# Patient Record
Sex: MALE | Race: WHITE | NOT HISPANIC OR LATINO | Employment: FULL TIME | URBAN - METROPOLITAN AREA
[De-identification: names, ages, dates, MRNs, and addresses within clinical notes are randomized per-mention and may not be internally consistent; named-entity substitution may affect disease eponyms.]

---

## 2022-07-18 ENCOUNTER — TELEPHONE (OUTPATIENT)
Dept: UROLOGY | Facility: CLINIC | Age: 37
End: 2022-07-18

## 2022-07-18 NOTE — TELEPHONE ENCOUNTER
Spoke with pt, he would like to be seen for possible std. Pt scheduled for tomorrow.    ----- Message from Kenisha Sharif sent at 7/18/2022  2:11 PM CDT -----  Type:  Needs Medical Advice    Who Called: Billy Felton    Symptoms (please be specific): -   How long has patient had these symptoms:  -  Pharmacy name and phone #:  -  Would the patient rather a call back or a response via [facility]?    Best Call Back Number: [phone] or [phone]    Additional Information:  pt checking status of SDA to be tested for STD please call

## 2022-07-19 ENCOUNTER — OFFICE VISIT (OUTPATIENT)
Dept: UROLOGY | Facility: CLINIC | Age: 37
End: 2022-07-19

## 2022-07-19 VITALS
SYSTOLIC BLOOD PRESSURE: 143 MMHG | DIASTOLIC BLOOD PRESSURE: 79 MMHG | HEIGHT: 69 IN | BODY MASS INDEX: 22.96 KG/M2 | WEIGHT: 155 LBS | HEART RATE: 74 BPM

## 2022-07-19 DIAGNOSIS — R30.0 DYSURIA: Primary | ICD-10-CM

## 2022-07-19 DIAGNOSIS — Z20.2 STD EXPOSURE: ICD-10-CM

## 2022-07-19 LAB
HIV 1+2 AB+HIV1 P24 AG SERPL QL IA: NONREACTIVE
SYPHILIS TREPONEMAL ANTIBODY: NONREACTIVE

## 2022-07-19 PROCEDURE — 96372 THER/PROPH/DIAG INJ SC/IM: CPT | Mod: S$GLB,,, | Performed by: NURSE PRACTITIONER

## 2022-07-19 PROCEDURE — 96372 PR INJECTION,THERAP/PROPH/DIAG2ST, IM OR SUBCUT: ICD-10-PCS | Mod: S$GLB,,, | Performed by: NURSE PRACTITIONER

## 2022-07-19 PROCEDURE — 99203 OFFICE O/P NEW LOW 30 MIN: CPT | Mod: 25,S$GLB,, | Performed by: NURSE PRACTITIONER

## 2022-07-19 PROCEDURE — 99203 PR OFFICE/OUTPT VISIT, NEW, LEVL III, 30-44 MIN: ICD-10-PCS | Mod: 25,S$GLB,, | Performed by: NURSE PRACTITIONER

## 2022-07-19 RX ORDER — CEFTRIAXONE 500 MG/1
500 INJECTION, POWDER, FOR SOLUTION INTRAMUSCULAR; INTRAVENOUS ONCE
Status: COMPLETED | OUTPATIENT
Start: 2022-07-19 | End: 2022-07-19

## 2022-07-19 RX ORDER — AMOXICILLIN 500 MG
CAPSULE ORAL DAILY
COMMUNITY

## 2022-07-19 RX ORDER — LORAZEPAM 0.5 MG/1
TABLET ORAL
COMMUNITY
Start: 2022-06-30

## 2022-07-19 RX ORDER — CEFTRIAXONE 500 MG/1
500 INJECTION, POWDER, FOR SOLUTION INTRAMUSCULAR; INTRAVENOUS
Status: CANCELLED | OUTPATIENT
Start: 2022-07-19 | End: 2022-07-19

## 2022-07-19 RX ORDER — IBUPROFEN 100 MG/5ML
1000 SUSPENSION, ORAL (FINAL DOSE FORM) ORAL DAILY
COMMUNITY

## 2022-07-19 RX ORDER — AZITHROMYCIN 500 MG/1
1000 TABLET, FILM COATED ORAL DAILY
Qty: 2 TABLET | Refills: 0 | Status: SHIPPED | OUTPATIENT
Start: 2022-07-19

## 2022-07-19 RX ORDER — CHOLECALCIFEROL (VITAMIN D3) 25 MCG
1000 TABLET ORAL DAILY
COMMUNITY

## 2022-07-19 RX ADMIN — CEFTRIAXONE 500 MG: 500 INJECTION, POWDER, FOR SOLUTION INTRAMUSCULAR; INTRAVENOUS at 11:07

## 2022-07-19 NOTE — PROGRESS NOTES
Subjective:       Patient ID: Billy Felton is a 37 y.o. male.    Chief Complaint: Other (Burns sometimes when voiding, just wants STD check)      HPI: 37-year-old male, presents complaint of STD concern.  Patient states he had oral sex performed on him approximately 3 days ago.  Patient states since then he has been having some dysuria.  States he has burning at the tip of his penis with urination.  He denies any discharge.  Denies any open wounds or sores.  Denies any fever or body aches.  Denies any blood in the urine.  No other urinary complaints at this time.         Past Medical History:   Past Medical History:   Diagnosis Date    Anxiety disorder, unspecified        Past Surgical Historical:   Past Surgical History:   Procedure Laterality Date    CIRCUMCISION      infant    PLANTAR'S WART EXCISION Right     RHINOPLASTY          Medications:   Medication List with Changes/Refills   New Medications    AZITHROMYCIN (ZITHROMAX) 500 MG TABLET    Take 2 tablets (1,000 mg total) by mouth once daily.   Current Medications    ASCORBIC ACID, VITAMIN C, (VITAMIN C) 1000 MG TABLET    Take 1,000 mg by mouth once daily.    LORAZEPAM (ATIVAN) 0.5 MG TABLET        OMEGA-3 FATTY ACIDS/FISH OIL (FISH OIL-OMEGA-3 FATTY ACIDS) 300-1,000 MG CAPSULE    Take by mouth once daily.    VITAMIN D (VITAMIN D3) 1000 UNITS TAB    Take 1,000 Units by mouth once daily.        Past Social History:   Social History     Socioeconomic History    Marital status:    Tobacco Use    Smoking status: Never Smoker    Smokeless tobacco: Never Used   Substance and Sexual Activity    Alcohol use: Yes    Sexual activity: Yes       Allergies: Review of patient's allergies indicates:  No Known Allergies     Family History:   Family History   Problem Relation Age of Onset    No Known Problems Father     No Known Problems Mother         Review of Systems:  Review of Systems   Constitutional: Negative for activity change and appetite change.    HENT: Negative for congestion and dental problem.    Respiratory: Negative for chest tightness and shortness of breath.    Cardiovascular: Negative for chest pain.   Gastrointestinal: Negative for abdominal distention and abdominal pain.   Genitourinary: Positive for dysuria. Negative for decreased urine volume, difficulty urinating, enuresis, flank pain, frequency, genital sores, hematuria, penile discharge, penile pain, penile swelling, scrotal swelling, testicular pain and urgency.   Musculoskeletal: Negative for back pain and neck pain.   Neurological: Negative for dizziness.   Hematological: Negative for adenopathy.   Psychiatric/Behavioral: Negative for agitation, behavioral problems and confusion.       Physical Exam:  Physical Exam  Vitals and nursing note reviewed.   Constitutional:       Appearance: He is well-developed.   HENT:      Head: Normocephalic.   Cardiovascular:      Rate and Rhythm: Normal rate and regular rhythm.      Heart sounds: Normal heart sounds.   Pulmonary:      Effort: Pulmonary effort is normal.      Breath sounds: Normal breath sounds.   Abdominal:      General: Bowel sounds are normal.      Palpations: Abdomen is soft.   Genitourinary:     Penis: Normal and circumcised.    Skin:     General: Skin is warm and dry.   Neurological:      Mental Status: He is alert and oriented to person, place, and time.       Urinalysis:  Ketones 15 otherwise normal.    Assessment/Plan:   Dysuria/STD exposure:  Will treat patient with Rocephin 500 mg IM today.  Will also treat patient with azithromycin 1000 mg orally.  Script sent to pharmacy.  Will test patient for STDs.  Will test for syphilis, HSV, HIV, gonorrhea, and chlamydia.  Will notify patient results.    Follow-up as needed.  Problem List Items Addressed This Visit    None     Visit Diagnoses     Dysuria    -  Primary    Relevant Medications    cefTRIAXone injection 500 mg (Start on 7/19/2022 12:00 PM)    azithromycin (ZITHROMAX) 500 MG  tablet    Other Relevant Orders    POCT Urinalysis (w/Micro Option)    RPR    Herpes Simplex Virus (HSV) 1 & 2, IgM, IFA w/Rfx to Titer    HIV 1/2 Ag/Ab (4th Gen)    C. trachomatis/N. gonorrhoeae by AMP DNA    STD exposure        Relevant Orders    RPR    Herpes Simplex Virus (HSV) 1 & 2, IgM, IFA w/Rfx to Titer    HIV 1/2 Ag/Ab (4th Gen)    C. trachomatis/N. gonorrhoeae by AMP DNA

## 2022-07-20 ENCOUNTER — TELEPHONE (OUTPATIENT)
Dept: UROLOGY | Facility: CLINIC | Age: 37
End: 2022-07-20

## 2022-07-20 NOTE — TELEPHONE ENCOUNTER
----- Message from Birgit Lemos sent at 7/20/2022  9:29 AM CDT -----  Contact: self  Requesting a call back regarding results from visit he had - also wanted to make sure this would self-pay all the way. Please call back at [phone]      9:54 AM Returned patient's call in regards to lab results. Informed him that it may be a few more days before all of his lab results were in. Also informed him that self-pay was fine. Verbalized understanding.     NELY Singh RN

## 2022-07-22 ENCOUNTER — TELEPHONE (OUTPATIENT)
Dept: UROLOGY | Facility: CLINIC | Age: 37
End: 2022-07-22

## 2022-07-22 NOTE — TELEPHONE ENCOUNTER
Notified pt that once provider seen labs we would get back with him. ED----- Message from Shiv Suggs sent at 7/22/2022  9:19 AM CDT -----  Contact: pt  .Type:  Test Results    Who Called: ning  Name of Test (Lab/Mammo/Etc): blood work/ urine analysis  Date of Test:   Ordering Provider: cholo   Where the test was performed:   Would the patient rather a call back or a response via [facility]?   Best Call Back Number: .[phone]    Additional Information:

## 2024-04-29 ENCOUNTER — TELEPHONE (OUTPATIENT)
Dept: UROLOGY | Facility: CLINIC | Age: 39
End: 2024-04-29
Payer: COMMERCIAL

## 2024-04-29 NOTE — TELEPHONE ENCOUNTER
Returned request for call back. Patient requested an appointment for a vasectomy consult. Patient scheduled for this Wednesday, May 1, 2024 with [facility]. Verbalized understanding.                 ----- Message from Angie Funez sent at 4/29/2024 11:48 AM CDT -----  Regarding: Consultation  Contact: patient  Per phone call with patient he would like to have an consultation to  discuss having a vasectomy to be done with the physician.  Please return call at [phone] (home).    Thanks,  SJ

## 2024-05-01 ENCOUNTER — OFFICE VISIT (OUTPATIENT)
Dept: UROLOGY | Facility: CLINIC | Age: 39
End: 2024-05-01
Payer: COMMERCIAL

## 2024-05-01 VITALS
HEART RATE: 67 BPM | SYSTOLIC BLOOD PRESSURE: 121 MMHG | BODY MASS INDEX: 22.96 KG/M2 | DIASTOLIC BLOOD PRESSURE: 72 MMHG | HEIGHT: 69 IN | WEIGHT: 155 LBS

## 2024-05-01 DIAGNOSIS — Z30.2 ENCOUNTER FOR MALE STERILIZATION PROCEDURE: Primary | ICD-10-CM

## 2024-05-01 PROCEDURE — 3078F DIAST BP <80 MM HG: CPT | Mod: CPTII,S$GLB,, | Performed by: FAMILY MEDICINE

## 2024-05-01 PROCEDURE — 3074F SYST BP LT 130 MM HG: CPT | Mod: CPTII,S$GLB,, | Performed by: FAMILY MEDICINE

## 2024-05-01 PROCEDURE — 1159F MED LIST DOCD IN RCRD: CPT | Mod: CPTII,S$GLB,, | Performed by: FAMILY MEDICINE

## 2024-05-01 PROCEDURE — 99213 OFFICE O/P EST LOW 20 MIN: CPT | Mod: S$GLB,,, | Performed by: FAMILY MEDICINE

## 2024-05-01 PROCEDURE — 3008F BODY MASS INDEX DOCD: CPT | Mod: CPTII,S$GLB,, | Performed by: FAMILY MEDICINE

## 2024-05-01 NOTE — PROGRESS NOTES
Subjective:       Patient ID: Billy Felton is a 39 y.o. male.    Chief Complaint: No chief complaint on file.      HPI: 39-year-old male patient presenting to clinic to establish care requesting a vasectomy consult.  He has 2 children and denies any issues         Past Medical History:   Past Medical History:   Diagnosis Date    Anxiety disorder, unspecified        Past Surgical Historical:   Past Surgical History:   Procedure Laterality Date    CIRCUMCISION      infant    PLANTAR'S WART EXCISION Right     RHINOPLASTY          Medications:   Medication List with Changes/Refills   Current Medications    ASCORBIC ACID, VITAMIN C, (VITAMIN C) 1000 MG TABLET    Take 1,000 mg by mouth once daily.    LORAZEPAM (ATIVAN) 0.5 MG TABLET        OMEGA-3 FATTY ACIDS/FISH OIL (FISH OIL-OMEGA-3 FATTY ACIDS) 300-1,000 MG CAPSULE    Take by mouth once daily.    VITAMIN D (VITAMIN D3) 1000 UNITS TAB    Take 1,000 Units by mouth once daily.   Discontinued Medications    AZITHROMYCIN (ZITHROMAX) 500 MG TABLET    Take 2 tablets (1,000 mg total) by mouth once daily.        Past Social History:   Social History     Socioeconomic History    Marital status:    Tobacco Use    Smoking status: Never    Smokeless tobacco: Never   Substance and Sexual Activity    Alcohol use: Yes    Sexual activity: Yes       Allergies: Review of patient's allergies indicates:  No Known Allergies     Family History:   Family History   Problem Relation Name Age of Onset    No Known Problems Father      No Known Problems Mother          Review of Systems:  Review of Systems   Constitutional: Negative.    HENT: Negative.     Eyes: Negative.    Respiratory: Negative.     Cardiovascular: Negative.    Gastrointestinal: Negative.    Endocrine: Negative.    Genitourinary: Negative.    Musculoskeletal: Negative.    Skin: Negative.    Allergic/Immunologic: Negative.    Neurological: Negative.    Hematological: Negative.    Psychiatric/Behavioral: Negative.          Physical Exam:  Physical Exam  Constitutional:       Appearance: He is normal weight.   HENT:      Head: Normocephalic.      Nose: Nose normal.      Mouth/Throat:      Mouth: Mucous membranes are moist.      Pharynx: Oropharynx is clear.   Eyes:      Extraocular Movements: Extraocular movements intact.      Conjunctiva/sclera: Conjunctivae normal.      Pupils: Pupils are equal, round, and reactive to light.   Cardiovascular:      Rate and Rhythm: Normal rate and regular rhythm.      Pulses: Normal pulses.      Heart sounds: Normal heart sounds.   Pulmonary:      Effort: Pulmonary effort is normal.      Breath sounds: Normal breath sounds.   Abdominal:      General: Abdomen is flat. Bowel sounds are normal.      Palpations: Abdomen is soft.      Hernia: There is no hernia in the right inguinal area or left inguinal area.   Genitourinary:     Penis: Normal. No phimosis, paraphimosis, hypospadias, erythema, tenderness or discharge.       Testes: Normal.         Right: Mass, tenderness or swelling not present. Right testis is descended. Cremasteric reflex is present.          Left: Mass, tenderness or swelling not present. Left testis is descended. Cremasteric reflex is present.       Prostate: Normal.      Rectum: Normal.   Musculoskeletal:         General: Normal range of motion.      Cervical back: Normal range of motion and neck supple.   Lymphadenopathy:      Lower Body: No right inguinal adenopathy. No left inguinal adenopathy.   Skin:     General: Skin is warm and dry.      Capillary Refill: Capillary refill takes less than 2 seconds.   Neurological:      General: No focal deficit present.      Mental Status: He is alert and oriented to person, place, and time. Mental status is at baseline.   Psychiatric:         Mood and Affect: Mood normal.         Behavior: Behavior normal.         Thought Content: Thought content normal.         Judgment: Judgment normal.         Assessment/Plan:     Vasectomy:  We had  a long discussion regarding vasectomy including the risks and benefits.  The patient understands the permanent nature of the procedure he also understands the potential risks of vasectomy including but not limited to injury to the testicle loss of testicle bleeding hematoma infection and testicular atrophy.  Patient understands these risks is willing to proceed we will schedule vasectomy next available date.    Problem List Items Addressed This Visit    None  Visit Diagnoses       Encounter for male sterilization procedure    -  Primary    Relevant Orders    Vasectomy

## 2024-05-15 ENCOUNTER — PROCEDURE VISIT (OUTPATIENT)
Dept: UROLOGY | Facility: CLINIC | Age: 39
End: 2024-05-15
Payer: COMMERCIAL

## 2024-05-15 DIAGNOSIS — Z30.2 ENCOUNTER FOR MALE STERILIZATION PROCEDURE: Primary | ICD-10-CM

## 2024-05-15 PROCEDURE — 55250 REMOVAL OF SPERM DUCT(S): CPT | Mod: S$GLB,,, | Performed by: UROLOGY

## 2024-05-15 RX ORDER — HYDROCODONE BITARTRATE AND ACETAMINOPHEN 7.5; 325 MG/1; MG/1
1 TABLET ORAL EVERY 6 HOURS PRN
Qty: 10 TABLET | Refills: 0 | Status: SHIPPED | OUTPATIENT
Start: 2024-05-15

## 2024-05-15 NOTE — PROCEDURES
"Vasectomy    Date/Time: 5/15/2024 11:00 AM    Performed by: Adriano Thompson MD  Authorized by: Haley Slater NP    Consent Done?:  Yes (Written)  Time out: Immediately prior to procedure a "time out" was called to verify the correct patient, procedure, equipment, support staff and site/side marked as required.    Indications:  [place] male  Patient sedated: No    Preparation: Patient was prepped and draped in usual sterile fashion    Incisions:  1  Length vas excised:  2.5 cm  Vas:  Fulgurated and Buried  Sutures:  3-0 chromic SH  Same procedure performed on both sides    Patient tolerance:  Patient tolerated the procedure well with no immediate complications     Patient was brought to the procedure room placed on table in supine position he was then prepped and draped in the usual sterile fashion. A 2 cm incision was made upper portion of the midline of the scrotum secured into the dartos fascia of the right vas deferens was isolated and skeletonized using the Vas clamps, and at this point a 2 cm segment of the vas deferens was excised, both ends of the vas were fulgurated the proximal end was dunked into the surrounding dartos and pursestring closed the that is on the right side was returned to the scrotum the identical procedure was done on the contralateral side the incision was closed with a 3 O chromic suture patient tolerated the procedure with oral complications.    "

## 2024-05-15 NOTE — PATIENT INSTRUCTIONS
"Patient Education       Vasectomy   The Basics   Written by the doctors and editors at [facility]   What is a vasectomy? -- A vasectomy is a procedure that can be done as a type of long-term birth control. After a successful vasectomy, you will not be able to get a partner pregnant.  How does a vasectomy prevent pregnancy? -- A vasectomy prevents pregnancy by blocking the path the sperm takes to leave the body (figure 1).  Sperm are made in the testicles. The testicles are found inside a skin sac called the "scrotum." Sperm are stored in the epididymis, which is a small organ that sits on top of the testicles. During ejaculation, sperm travel from the epididymis through tubes and out the end of the penis.  During a vasectomy, a doctor cuts and blocks a tube called the "vas deferens" on each side. This prevents sperm from leaving the body. After a vasectomy, you can still ejaculate fluid, called semen. But the semen does not have any sperm in it.  Why might I choose to have a vasectomy? -- You might choose to have a vasectomy if you do not want any more biological children, and do not want to use birth control each time you have sex.  Let your doctor know if you have any questions or worries about having a vasectomy. They can talk with you and tell you about the procedure.  Some people choose to have a sample of their sperm saved before they have a vasectomy. If you want to have a sample of your sperm saved, talk with your doctor.  What happens during a vasectomy? -- A vasectomy is done in a doctor's office and takes about 30 minutes. During the procedure, a doctor numbs the skin on the scrotum. Then they make a small cut in the skin to reach the vas deferens, cut it, and seal it off. The procedure does not hurt, but some people can feel cramping or pulling.  What happens after a vasectomy? -- You can go home right after the procedure, but you will need to rest for 2 to 3 days. After a vasectomy, you will likely have " "some discomfort and bruising in your scrotum. Your doctor will tell you which pain-relieving medicines to take. They might also prescribe a medicine to treat your pain.  Your doctor will give you instructions about what you should and should not do after your vasectomy. They will probably tell you to:  Wear a jock strap to hold the bandage in place  Not bathe or swim for 1 to 2 days  Not lift heavy objects or exercise too hard for 7 days  Wait 7 days to have sex. After that, you must use another form of birth control for a few months to prevent pregnancy.  What are the side effects of a vasectomy? -- Side effects are uncommon, but can occur. They can include:  Severe pain in the scrotum  Bleeding in the scrotum  Infection of the skin around the cut  If you have any side effects, let your doctor know. Some side effects go away over time, but others might need treatment.  How long does it take for a vasectomy to work? -- It takes a few months for a vasectomy to work. That's because the tubes can still have sperm in them.  You will need to ejaculate 20 or more times after a vasectomy to clear out all the sperm from the tubes. Because of this, it's important to use another type of birth control for a few months to prevent getting a partner pregnant.  How will I know my vasectomy worked? -- Yes. You will have a follow-up test called a "sperm count" to make sure your semen does not have any sperm in it. This is usually done 3 months after their vasectomy.  A sperm count checks how many sperm are in a sample of semen. For this test, you will need to provide a sample of your semen.  If your sample has no sperm, you can stop using other birth control because you will not be able to get a partner pregnant. But if your sample does have sperm in it, you could get a partner pregnant. You should still use birth control until you have another sperm count done.  What if I change my mind after having a vasectomy? -- If you have had " "a vasectomy and decide that you do want to be able to get your partner pregnant, talk with your doctor. A surgery to reconnect the vas deferens and open the sperm's path can be done. But this surgery does not always work.  Does a vasectomy prevent me getting a disease from sex? -- No. A vasectomy does not prevent you from getting or spreading a disease from sex. To prevent getting or spreading a disease from sex, you should use a type of protection called a condom.  All topics are updated as new evidence becomes available and our peer review process is complete.  This topic retrieved from [employer] on: Sep 21, 2021.  Topic 66293 Version 8.0  Release: 29.4.2 - C29.263  © 2021 UpToDate, Inc. and/or its affiliates. All rights reserved.  figure 1: Vasectomy     A vasectomy is a procedure that can be done as a type of long-term birth control. After a successful vasectomy, you will not be able to get a partner pregnant.Sperm are made in the testicles and stored in the epididymis. During a vasectomy, a doctor cuts and blocks off the two tubes that carry sperm out of the epididymis. These tubes - one on the left and one on the right - are called the "vas deferens." After the surgery, sperm get reabsorbed into the body. The sperm do not come out during ejaculation.  Graphic 96845 Version 13.0    Consumer Information Use and Disclaimer   This information is not specific medical advice and does not replace information you receive from your health care provider. This is only a brief summary of general information. It does NOT include all information about conditions, illnesses, injuries, tests, procedures, treatments, therapies, discharge instructions or life-style choices that may apply to you. You must talk with your health care provider for complete information about your health and treatment options. This information should not be used to decide whether or not to accept your health care provider's advice, instructions or " recommendations. Only your health care provider has the knowledge and training to provide advice that is right for you. The use of this information is governed by the [employer] End User License Agreement, available at [email or web address]/[email or web address].The use of [employer] content is governed by the [employer] Terms of Use. ©2021 UpToDate, Inc. All rights reserved.  Copyright   © 2021 UpToDate, Inc. and/or its affiliates. All rights reserved.

## 2024-06-04 ENCOUNTER — TELEPHONE (OUTPATIENT)
Dept: UROLOGY | Facility: CLINIC | Age: 39
End: 2024-06-04
Payer: COMMERCIAL

## 2024-06-04 NOTE — TELEPHONE ENCOUNTER
Pt called stating post vas his stitches recently fell out and yesterday he had a scab that came off and is having some bleeding.  Pt has a band aid over the site. I advised pt to take band aid off and apply neosporin and keep open to air. Site is no longer bleeding. Pt will call with any other issues.    ----- Message from Ashley Grande sent at 6/4/2024  8:31 AM CDT -----  Contact: self  Type:  Patient Returning Call    Who Called:Billy Felton  Who Left Message for Patient:unsure  Does the patient know what this is regarding?:post op questions, mild bleeding, tenderness around surgical site,   Would the patient rather a call back or a response via [facility]?   Best Call Back Number:[phone]  Additional Information: pt states a scab was pulled off(attached to undergarments) causing a bleed

## 2024-08-22 ENCOUNTER — TELEPHONE (OUTPATIENT)
Dept: UROLOGY | Facility: CLINIC | Age: 39
End: 2024-08-22
Payer: COMMERCIAL

## 2024-08-22 NOTE — TELEPHONE ENCOUNTER
Spoke with pt and informed once analysis is reviewed I will give him a call with results.    ----- Message from Britt Juany sent at 8/22/2024  9:05 AM CDT -----  Contact: Billy  Type:  Test Results    Who Called: Billy  Name of Test (Lab/Mammo/Etc): Specimen test  Date of Test: 8/19/24  Ordering Provider: Dr. Thompson  Where the test was performed: [facility]  Would the patient rather a call back or a response via [facility]? call  Best Call Back Number: [phone]  Additional Information: Patient request to receive test results. Please give patient a call back to assist.   Thank you,  GH

## 2024-08-22 NOTE — TELEPHONE ENCOUNTER
Spoke with pt and informed pt based on his semen analysis results and after reviewed by Dr. Thompson he is not sterile. Pt advised to repeat analysis in 1 month and to ejaculate as much as he can. Pt will call back in 1 month for order to be sent and will come by to  semen kit. Pt verbalized understanding.

## 2024-09-20 ENCOUNTER — TELEPHONE (OUTPATIENT)
Dept: UROLOGY | Facility: CLINIC | Age: 39
End: 2024-09-20
Payer: COMMERCIAL

## 2024-09-20 NOTE — TELEPHONE ENCOUNTER
Order faxed.    ----- Message from Micheline Maradiaga sent at 9/20/2024  9:29 AM CDT -----  Regarding: orders  Rogelio in [facility] calling for order for semen analysis and need order to release results and she can be reached at [phone] and fax [phone].    Thanks,

## 2024-09-23 ENCOUNTER — TELEPHONE (OUTPATIENT)
Dept: UROLOGY | Facility: CLINIC | Age: 39
End: 2024-09-23
Payer: COMMERCIAL

## 2024-09-23 NOTE — TELEPHONE ENCOUNTER
Informed pt is analysis results will be reviewed by Dr. Thompson tomorrow and then will give him a call.    ----- Message from [employer] sent at 9/23/2024 10:18 AM CDT -----  Contact: self  Patient is requesting a call back regarding serum results. Please call back at [phone]

## 2024-09-24 ENCOUNTER — TELEPHONE (OUTPATIENT)
Dept: UROLOGY | Facility: CLINIC | Age: 39
End: 2024-09-24
Payer: COMMERCIAL